# Patient Record
Sex: MALE
[De-identification: names, ages, dates, MRNs, and addresses within clinical notes are randomized per-mention and may not be internally consistent; named-entity substitution may affect disease eponyms.]

---

## 2018-10-05 ENCOUNTER — HOSPITAL ENCOUNTER (INPATIENT)
Dept: HOSPITAL 31 - C.ER | Age: 48
LOS: 5 days | Discharge: HOME | DRG: 754 | End: 2018-10-10
Attending: PSYCHIATRY & NEUROLOGY | Admitting: PSYCHIATRY & NEUROLOGY
Payer: MEDICAID

## 2018-10-05 DIAGNOSIS — K21.9: ICD-10-CM

## 2018-10-05 DIAGNOSIS — F10.230: ICD-10-CM

## 2018-10-05 DIAGNOSIS — R29.6: ICD-10-CM
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
## 2018-10-05 DIAGNOSIS — F43.10: ICD-10-CM

## 2018-10-05 DIAGNOSIS — I10: ICD-10-CM

## 2018-10-05 DIAGNOSIS — F32.9: Primary | ICD-10-CM

## 2018-10-05 LAB
ALBUMIN SERPL-MCNC: 4.2 G/DL (ref 3.5–5)
ALBUMIN/GLOB SERPL: 1 {RATIO} (ref 1–2.1)
ALT SERPL-CCNC: 35 U/L (ref 21–72)
AST SERPL-CCNC: 97 U/L (ref 17–59)
BACTERIA #/AREA URNS HPF: (no result) /[HPF]
BASOPHILS # BLD AUTO: 0.1 K/UL (ref 0–0.2)
BASOPHILS NFR BLD: 1.5 % (ref 0–2)
BILIRUB UR-MCNC: NEGATIVE MG/DL
BUN SERPL-MCNC: 11 MG/DL (ref 9–20)
CALCIUM SERPL-MCNC: 9.5 MG/DL (ref 8.6–10.4)
EOSINOPHIL # BLD AUTO: 0.1 K/UL (ref 0–0.7)
EOSINOPHIL NFR BLD: 1.7 % (ref 0–4)
ERYTHROCYTE [DISTWIDTH] IN BLOOD BY AUTOMATED COUNT: 16.1 % (ref 11.5–14.5)
GFR NON-AFRICAN AMERICAN: > 60
GLUCOSE UR STRIP-MCNC: NORMAL MG/DL
HGB BLD-MCNC: 11.5 G/DL (ref 12–18)
LEUKOCYTE ESTERASE UR-ACNC: (no result) LEU/UL
LYMPHOCYTES # BLD AUTO: 1.3 K/UL (ref 1–4.3)
LYMPHOCYTES NFR BLD AUTO: 27.2 % (ref 20–40)
MCH RBC QN AUTO: 30.2 PG (ref 27–31)
MCHC RBC AUTO-ENTMCNC: 33.1 G/DL (ref 33–37)
MCV RBC AUTO: 91.2 FL (ref 80–94)
MONOCYTES # BLD: 0.5 K/UL (ref 0–0.8)
MONOCYTES NFR BLD: 9.5 % (ref 0–10)
NEUTROPHILS # BLD: 2.9 K/UL (ref 1.8–7)
NEUTROPHILS NFR BLD AUTO: 60.1 % (ref 50–75)
NRBC BLD AUTO-RTO: 0 % (ref 0–2)
PH UR STRIP: 6 [PH] (ref 5–8)
PLATELET # BLD: 152 K/UL (ref 130–400)
PMV BLD AUTO: 6.7 FL (ref 7.2–11.7)
PROT UR STRIP-MCNC: NEGATIVE MG/DL
RBC # BLD AUTO: 3.81 MIL/UL (ref 4.4–5.9)
RBC # UR STRIP: NEGATIVE /UL
SP GR UR STRIP: 1.01 (ref 1–1.03)
UROBILINOGEN UR-MCNC: 4 MG/DL (ref 0.2–1)
WBC # BLD AUTO: 4.8 K/UL (ref 4.8–10.8)

## 2018-10-05 PROCEDURE — GZ56ZZZ INDIVIDUAL PSYCHOTHERAPY, SUPPORTIVE: ICD-10-PCS | Performed by: PSYCHIATRY & NEUROLOGY

## 2018-10-05 PROCEDURE — GZHZZZZ GROUP PSYCHOTHERAPY: ICD-10-PCS | Performed by: PSYCHIATRY & NEUROLOGY

## 2018-10-05 NOTE — PCM.BM
<Tejas Do - Last Filed: 10/05/18 20:22>





Treatment Plan Problems





- Problems identified on initial assessmt


  ** potential foe alcohol withdrawal


Date Initiated: 10/05/18


Time Initiated: 20:22


Status: Active





Treatment assets and liabiliti


Patient Assests: ADL independent


Patient Liabilities: substance abuse, medical problems





- Milieu Protocol


Maintain good personal hygiene: daily Encourage regular showers, daily Remind 

patient to perform daily oral care, daily Assist patient to perform ADL's


Conduct patient checks and document Observation sheet: Q15 minutes


Maintain personal safety: every shift Educate patient to report safety concerns 

to staff, every shift Monitor environment for contraband/sharps


Medication safety: Monitor for expected outcome, potential side effects: every 

shift, Assess barriers to learning: every shift, Assess readiness for medication

education: every shift





<Raissa Franklin - Last Filed: 10/08/18 13:41>





- Diagnosis


(1) Alcohol use disorder, severe, dependence


Status: Acute   


Interventions: 





10/08/18 13:41


* Assess 7x/week regarding severity of withdrawal


* Educate regarding risks, benefits, side effects and alternatives of 

  medications


* Use Motivational Interviewing for abstinence


* Use CBT for relapse prevention


* Medication management for withdrawal symptoms


* Encourage medication assisted treatment


* 








(2) Major depression


Status: Acute   


Interventions: 





10/08/18 13:41


* Assess/adjust medications daily and /or as needed


* See patient on an individual basis 7x/week to assess symptoms of depression


* Monitor for side effects & effectiveness of medications


*
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
## 2018-10-05 NOTE — C.PDOC
History Of Present Illness


48 year old male, whose PMHx includes HTN, Depression, Anxiety, PTSD and 

Alcoholism, presents to the ED requesting alcohol detox. Patient states he 

drinks Vodka every day and his last drink was 3.5 hours prior to arrival. 

Patient is complaining of headache, dizziness, nausea, and vomiting in the 

morning. Patient also states he has been feeling tremulous and has undergone 

multiple falls. Patient states he fell down a hill last week and injured his 

left ankle, for which he was evaluated at another medical facility. Patient also

sustained another fall during which he landed onto his face, for which he was 

also seen at another facility. Patient denies suicidal/homicidal ideation or any

other substance abuse at this time. 


Time Seen by Provider: 10/05/18 16:51


Chief Complaint (Nursing): Substance Abuse


History Per: Patient


History/Exam Limitations: no limitations


Onset/Duration Of Symptoms: Hrs


Current Symptoms Are (Timing): Still Present


Suicide/Self Injury Attempted (Context): None


Modifying Factor(s): Alcohol


Associated Symptoms: denies: Suicidal Thoughts, Suicidal Plan


Involuntary Hold By: None


Recent travel outside of the [place]: No


Additional History Per: Patient





Past Medical History


Reviewed: Historical Data, Nursing Documentation, Vital Signs


Vital Signs: 





                                Last Vital Signs











Temp  99.2 F   10/05/18 16:33


 


Pulse  119 H  10/05/18 16:33


 


Resp  20   10/05/18 16:33


 


BP  138/82   10/05/18 16:33


 


Pulse Ox  96   10/05/18 16:33














- Medical History


PMH: Anxiety, HTN, Post Traumatic Stress Disorder


Surgical History: No Surg Hx


Family History: States: Unknown Family Hx





- Social History


Hx Alcohol Use: Yes


Hx Substance Use: No





- Immunization History


Hx Tetanus Toxoid Vaccination: Yes


Hx Influenza Vaccination: Yes (8/2018)


Hx Pneumococcal Vaccination: No





Review Of Systems


Gastrointestinal: Positive for: Nausea, Vomiting


Neurological: Positive for: Headache, Dizziness


Psych: Positive for: Other (alcohol detox ).  Negative for: Suicidal ideation





Physical Exam





- Physical Exam


Appears: Non-toxic, No Acute Distress


Skin: Warm, Dry, Ecchymosis (to right cheek and forehead )


Head: Normacephalic


Eye(s): bilateral: Normal Inspection


Oral Mucosa: Moist


Neck: No Midline Cervical Tenderness, No Paracervical Tenderness, Supple


Chest: Symmetrical, No Deformity, No Tenderness


Cardiovascular: Rhythm Regular, No Murmur


Respiratory: Normal Breath Sounds, No Rales, No Rhonchi, No Wheezing


Gastrointestinal/Abdominal: Soft, No Tenderness, No Guarding, No Rebound


Extremity: Capillary Refill (less than 2 seconds ), Swelling (to left medial and

lateral malleolus ), Other (patient is able to bear weight )


Neurological/Psych: Oriented x3, Normal Speech, Normal Cognition, Other 

(cooperative )





ED Course And Treatment





- Laboratory Results


Result Diagrams: 


                                 10/05/18 17:45





                                 10/05/18 17:45


O2 Sat by Pulse Oximetry: 96 (on RA)


Pulse Ox Interpretation: Normal





Medical Decision Making


Medical Decision Making: 





Impression: 48 year old male seeking alcohol detox 


Plan: 


* bloodwork 


* urinalysis 


* left ankle XR 


* crisis evaluation 


* reassess and disposition 





Progress: 


bloodwork, urinalysis and left ankle XR ordered and reviewed. 


Patient will be evaluated by crisis worker for detox. 





Patient also admits to using marijuana and xanax





patient cleared for crisis. 





Disposition


Counseled Patient/Family Regarding: Studies Performed, Diagnosis





- Disposition


Disposition: HOSPITALIZED


Disposition Time: 18:23


Condition: STABLE


Forms:  [facility] (English)





- POA


Present On Arrival: None





- Clinical Impression


Clinical Impression: 


 Alcohol abuse








- Scribe Statement


The provider has reviewed the documentation as recorded by the Scribe (Analilia Garcia)


Provider Attestation: 








All medical record entries made by the Scribe were at my direction and 

personally dictated by me. I have reviewed the chart and agree that the record 

accurately reflects my personal performance of the history, physical exam, 

medical decision making, and the department course for this patient. I have also

personally directed, reviewed, and agree with the discharge instructions and 

disposition.

## 2018-10-06 RX ADMIN — Medication SCH TAB: at 09:07

## 2018-10-06 RX ADMIN — PANTOPRAZOLE SODIUM SCH MG: 40 TABLET, DELAYED RELEASE ORAL at 09:07
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
## 2018-10-07 VITALS — RESPIRATION RATE: 18 BRPM

## 2018-10-07 RX ADMIN — Medication SCH TAB: at 09:22

## 2018-10-07 RX ADMIN — GUAIFENESIN PRN MG: 100 SOLUTION ORAL at 11:10

## 2018-10-07 RX ADMIN — PANTOPRAZOLE SODIUM SCH MG: 40 TABLET, DELAYED RELEASE ORAL at 09:17

## 2018-10-07 RX ADMIN — GUAIFENESIN PRN MG: 100 SOLUTION ORAL at 15:55

## 2018-10-07 NOTE — PCM.PYCHPN
Psychiatric Progress Note





- Psychiatric Progress Note


Patient seen today, length of contact: 15 min


Patient Chief Complaint: 





I came in to get help.'


Problems Identified/Issues Discussed: 








Patient seen and evaluated, chart reviewed and discussed with the nurse.





Patient reports withdrawal symptoms including, cramps, nausea, anxiety and 

headaches. 





Patient reports some anxiety but denies any suicidal ideation or homicidal 

ideation. 





He denies any auditory or visual hallucinations. He is tolerating the withdrawal

medications and denies any side effects. 





Supportive therapy and psychoeducation were given.


Medication Change: Yes


Medical Record Reviewed: Yes





Mental Status Examination





- Cognitive Function


Orientation: Person, Place, Situation, Time


Memory: Intact


Attention: WNL


Concentration: Poor


Association: WNL


Fund of Knowledge: Poor





- Mood


Mood: Anxious





- Affect


Affect: Constricted





- Speech


Speech: Soft





- Formal Thought Process


Formal Thought Process: No Impairment





- Suicidal Ideation


Suicidal Ideation: No





- Homicidal Ideation


Homicidal Ideation: No





Goal/Treatment Plan





- Goal/Treatment Plan


Need for Continued Stay: Severe depression anxiety, Severe functional impairment


Progress Toward Problem(s) and Goals/Treatment Plan: 





Alcohol use d/o- severe


Alcohol withdrawal


Mood disorder





Taper with Ativan


Gabapentin for augmentation if needed


As needed medications


All risks, benefits and alternatives of the meds discussed,


 and the pt agreed and understood. 


Attend groups and activities


Supportive therapy and psychoeducation


MI for abstinence


CBT for relapse prevention


Encourage MAT


Refer to rehab or IOP, and self-help groups


Smoking cessation with MI


Nicotine patch if needed





- Smoking Cessation


Smoking Cessation Initiated: No

## 2018-10-08 RX ADMIN — PURIFIED WATER PRN LOZ: 99.05 LIQUID OPHTHALMIC at 20:46

## 2018-10-08 RX ADMIN — GUAIFENESIN PRN MG: 100 SOLUTION ORAL at 17:20

## 2018-10-08 RX ADMIN — GUAIFENESIN PRN MG: 100 SOLUTION ORAL at 10:32

## 2018-10-08 RX ADMIN — Medication SCH TAB: at 09:50

## 2018-10-08 RX ADMIN — PANTOPRAZOLE SODIUM SCH MG: 40 TABLET, DELAYED RELEASE ORAL at 09:50

## 2018-10-08 NOTE — PCM.PYCHPN
Psychiatric Progress Note





- Psychiatric Progress Note


Patient seen today, length of contact: 15 min


Patient Chief Complaint: 





"Anxious"


Problems Identified/Issues Discussed: 





The pt is seen, chart reviewed, case discussed with staff.


The pt is compliant with medications and reports no side-effects.


Symptoms are improving but needs more time to stabilize. 


Pt attends groups and activities.


Support given, psycho-education provided. 


After care discussed.





Medication Change: Yes (detox changes daily)


Medical Record Reviewed: Yes





Mental Status Examination





- Cognitive Function


Orientation: Person, Place, Situation, Time


Memory: Intact


Attention: WNL


Concentration: Poor


Association: WNL


Fund of Knowledge: Poor





- Mood


Mood: Anxious





- Affect


Affect: Constricted





- Speech


Speech: Soft





- Formal Thought Process


Formal Thought Process: No Impairment





- Suicidal Ideation


Suicidal Ideation: No





- Homicidal Ideation


Homicidal Ideation: No





Goal/Treatment Plan





- Goal/Treatment Plan


Need for Continued Stay: Severe depression anxiety, Discharge may exacerbated 

symptoms, Severe functional impairment


Progress Toward Problem(s) and Goals/Treatment Plan: 





Continue medications


Support and psychoeducation daily


Attend groups and activities daily


After care planning by counselors


All options discussed with the pt, he is open to them

## 2018-10-09 VITALS — OXYGEN SATURATION: 97 %

## 2018-10-09 RX ADMIN — GUAIFENESIN PRN MG: 100 SOLUTION ORAL at 09:29

## 2018-10-09 RX ADMIN — GUAIFENESIN PRN MG: 100 SOLUTION ORAL at 21:20

## 2018-10-09 RX ADMIN — PANTOPRAZOLE SODIUM SCH MG: 40 TABLET, DELAYED RELEASE ORAL at 09:32

## 2018-10-09 RX ADMIN — PURIFIED WATER PRN LOZ: 99.05 LIQUID OPHTHALMIC at 21:20

## 2018-10-09 RX ADMIN — Medication SCH TAB: at 09:30

## 2018-10-09 RX ADMIN — PURIFIED WATER PRN LOZ: 99.05 LIQUID OPHTHALMIC at 09:29

## 2018-10-09 NOTE — PCM.PYCHPN
Psychiatric Progress Note





- Psychiatric Progress Note


Patient seen today, length of contact: 16 min


Patient Chief Complaint: 





"I am feeling better"


Problems Identified/Issues Discussed: 


The pt is seen, chart reviewed, case discussed with staff.


Support and psychoeducation given, CBT and MI used briefly


No new symptoms reported, improving slowly and needs more time


No SEs from medications, risks discussed.


After care discussed


Medication Change: Yes (detox changes daily)


Medical Record Reviewed: Yes





Mental Status Examination





- Cognitive Function


Orientation: Person, Place, Situation, Time


Memory: Intact


Attention: WNL


Concentration: Poor


Association: WNL


Fund of Knowledge: Poor





- Mood


Mood: Anxious





- Affect


Affect: Constricted





- Speech


Speech: Soft





- Formal Thought Process


Formal Thought Process: No Impairment





- Suicidal Ideation


Suicidal Ideation: No





- Homicidal Ideation


Homicidal Ideation: No





Goal/Treatment Plan





- Goal/Treatment Plan


Need for Continued Stay: Severe depression anxiety, Discharge may exacerbated 

symptoms, Severe functional impairment


Progress Toward Problem(s) and Goals/Treatment Plan: 





Continue medications


Support and psychoeducation daily


Attend groups and activities daily


After care planning by counselors: Accepted by [facility] for admission tomorrow

## 2018-10-10 VITALS — DIASTOLIC BLOOD PRESSURE: 91 MMHG | SYSTOLIC BLOOD PRESSURE: 132 MMHG | TEMPERATURE: 99 F | HEART RATE: 97 BPM

## 2018-10-10 RX ADMIN — PANTOPRAZOLE SODIUM SCH MG: 40 TABLET, DELAYED RELEASE ORAL at 09:18

## 2018-10-10 RX ADMIN — Medication SCH TAB: at 09:17

## 2018-10-10 NOTE — PCM.PYCHDC
Mental Status Examination





- Mental Status Examination


Orientation: Person, Place, Situation, Time


Memory: Intact


Mood: Anxious


Affect: Constricted


Speech: Appropriate


Attention: WNL


Concentration: WNL


Association: WNL


Fund of Knowledge: WNL


Formal Thought Process: No Impairment


Suicidal Ideation: No


Current Homicidal Ideation?: No





Discharge Summary





- Discharge Note


Reason for Hospitalization: 





Alcohol detox


Consultations:: List each consultation separately and include:  1. Reason for 

request.  2. Findings.  3. Follow-up


Summary of Hospital Course include:: 1. Description of specific treatment plan 

utilized for patients during their course of treatmen.  2. Summarize the time-

course for resolution of acute symptoms and/or regressed behaviors.  3. Describe

issues identified and worked on during hospitalization.  4. Describe medication 

utilized.  5. Describe medical problems identified and treated.  6. Reassessment

of suicide risk


Summary of Hospital Course: 





The pt was admitted and started on treatment with psychotherapy, support, 

psycho-education and medications. 


MI and CBT used.


The pt attended groups and activities, as well as milieu therapy.


All the risks and benefits of medications are discussed and the patient 

understood and agreed.


The pt improved with the treatments provided. 


After care discussed with the patient. [facility] [email or web address]





- Final Diagnosis (DSM 5)


Condition upon Discharge: STABLE


DSM 5: 


Alcohol use d/o- severe


Alcohol withdrawal


Major depression


TRACEY


PTSD


Disposition: HOME/ ROUTINE


Follow-up Treatment Plan: 


Continue below medications after discharge.


Follow after care plan as discussed.


Use relapse prevention skills   


Return to ER or call 911 if suicidal, homicidal or symptoms relapse.


Stay away from stress, alcohol and drugs.


See primary doctor regularly and get labs.    





Prescriptions/Medication Reconciliation: 


amLODIPine [Norvasc] 5 mg PO DAILY #30 tab


Baclofen [Lioresal] 10 mg PO DAILY #30 tab


busPIRone [Buspar] 10 mg PO BID #60 tab


Gabapentin [Neurontin] 300 mg PO TID #90 cap


hydroCHLOROthiazide [Microzide] 12.5 mg PO DAILY #30 cap


Losartan [Cozaar] 50 mg PO DAILY #30 tab


Pantoprazole [Protonix EC Tab] 40 mg PO DAILY #30 ect


Prazosin HCl [Minipress] 2 mg PO HS #30 cap


Sertraline [Zoloft] 50 mg PO DAILY #30 tab


traZODone [Desyrel] 100 mg PO HS PRN #30 tab


 PRN Reason: Sleep